# Patient Record
(demographics unavailable — no encounter records)

---

## 2025-03-31 NOTE — HISTORY OF PRESENT ILLNESS
[FreeTextEntry1] : Ms. Hernandez is a 29yo F with trisomy 21, MVP with mild-mod MR, and PDA s/p ligation via L thoracotomy in 1993 who presents for follow up. She was seen previously by Dr. Hernandez: last note 2023 to obtain an annual interval TTE to assess MVP and MR. Followed for MR ROS: Patient admits to SOB, no PND, CP, trace edema, no syncope, no palpitations Mother, Bailee assisting with history as well.  Plan to see PMD for labwork this week Last TTE: TTE 10/12/2022: report from Dr. Hernandez 1. Bileaflet mitral valve prolapse. Mild-moderate mitral regurgitation. 2. Normal left ventricular internal dimensions and wall thicknesses. 3. Normal left ventricular systolic function. No segmental wall motion abnormalities. 4. Normal left ventricular diastolic function. 5. The right ventricle is not well visualized; grossly normal right ventricular systolic function. *** Compared with echocardiogram of 06/09/2021, no significant changes noted.  TTE 6/9/21: 1. Bileaflet mitral valve prolapse. Probable eccentric moderate mitral regurgitation (seen best image 67). 2. Aortic valve not well visualized; probably normal. 3. Normal left ventricular internal dimensions and wall thicknesses. 4. Endocardium not well visualized; grossly normal left ventricular systolic function. 5. Normal right ventricular size and function.  Labwork 2024:  HBA1C: 5.8 Lipid profile, TSH, CMP , CBC  WNL  # Mitral regurgitation -follow up TTE ( will schedule today) -Dietary recs:  -* Mediterranean/ Blue zones diet (high in fruits and veg, healthy fats like olive oil, nuts and fish; whole grains; yogurt,      * DASH (increase serving of fruits and vegetables to 5-6 servings per day)  - Low salt (< 2 G of sodium per day; < 1.5 G for even more BP lowering effect)  - Movement including decreasing sedentary behavior and increasing intentional exercise (150 minutes per week-  will f/u 2 mo to review TTE and labwork done by PMD

## 2025-04-11 NOTE — END OF VISIT
[] : Resident [FreeTextEntry3] : to engage in mental health services either through [facility], or if not available there, to let us know for  referral.

## 2025-04-11 NOTE — END OF VISIT
[] : Resident [FreeTextEntry3] : to engage in mental health services either through [facility], or if not available there, to let us know for  referral.

## 2025-05-27 NOTE — CARDIOLOGY SUMMARY
[de-identified] : 3/31: NSR, 80 [de-identified] : 11/17/23: LV systolic function is mildly decreased with an LVEF ~ 48%. RC - Normal systolic function. Tricuspid systolic excursion. LA is normal, RA is normal in size, Aortic valve is tricuspid with normal leaflet excursion. Eccentric mitral regurgitant jet. likely Mod MR. There is a prolapse of both mitral leaflets.. Stucturally normal Tricuspid valve adn normal pulmonic valve. No effusion.  ============================================================= TTE dated 10/12/2022 by Dr. Hernandez shows bileaflet mitral valve prolapse with mild to moderate mitral regurgitation. Left ventricular internal dimensions, wall thickness, systolic and diastolic function are normal, with no segmental wall motion abnormalities. The right ventricle is poorly visualized but appears to have grossly normal systolic function. Compared to the echocardiogram from 06/09/2021, no significant changes are noted. =======================================================================================TTE dated 6/9/2021 shows bileaflet mitral valve prolapse with probable eccentric moderate mitral regurgitation. The aortic valve is not well visualized but likely normal. Left ventricular internal dimensions and wall thickness are normal, with grossly normal systolic function despite limited endocardial visualization. Right ventricular size and function are normal.

## 2025-05-27 NOTE — HISTORY OF PRESENT ILLNESS
[FreeTextEntry1] : Ms. Hernandez is a 29yo F with trisomy 21, MVP with mild-mod MR, and PDA s/p ligation via L thoracotomy in 1993 who presents for follow up. She was seen previously by Dr. Hernandez: last note 2023 to obtain an annual interval TTE to assess MVP and MR. Followed for MR ROS: Patient admits to SOB, no PND, CP, trace edema, no syncope, no palpitations Mother, Bailee assisting with history as well.  Plan to see PMD for labwork this week Last TTE: TTE 10/12/2022: report from Dr. Hernandez 1. Bileaflet mitral valve prolapse. Mild-moderate mitral regurgitation. 2. Normal left ventricular internal dimensions and wall thicknesses. 3. Normal left ventricular systolic function. No segmental wall motion abnormalities. 4. Normal left ventricular diastolic function. 5. The right ventricle is not well visualized; grossly normal right ventricular systolic function. *** Compared with echocardiogram of 06/09/2021, no significant changes noted.  TTE 6/9/21: 1. Bileaflet mitral valve prolapse. Probable eccentric moderate mitral regurgitation (seen best image 67). 2. Aortic valve not well visualized; probably normal. 3. Normal left ventricular internal dimensions and wall thicknesses. 4. Endocardium not well visualized; grossly normal left ventricular systolic function. 5. Normal right ventricular size and function.   # Mitral regurgitation -follow up TTE ( will schedule today) -Dietary recs:  -* Mediterranean/ Blue zones diet (high in fruits and veg, healthy fats like olive oil, nuts and fish; whole grains; yogurt,      * DASH (increase serving of fruits and vegetables to 5-6 servings per day)  - Low salt (< 2 G of sodium per day; < 1.5 G for even more BP lowering effect)  - Movement including decreasing sedentary behavior and increasing intentional exercise (150 minutes per week-  will f/u 1 mo to review TTE - [facility] have not scheduled [facility] of [facility].